# Patient Record
Sex: MALE | Race: WHITE | URBAN - METROPOLITAN AREA
[De-identification: names, ages, dates, MRNs, and addresses within clinical notes are randomized per-mention and may not be internally consistent; named-entity substitution may affect disease eponyms.]

---

## 2020-11-12 ENCOUNTER — OFFICE VISIT (OUTPATIENT)
Dept: URBAN - METROPOLITAN AREA CLINIC 71 | Facility: CLINIC | Age: 75
End: 2020-11-12
Payer: MEDICARE

## 2020-11-12 DIAGNOSIS — H52.4 PRESBYOPIA: ICD-10-CM

## 2020-11-12 DIAGNOSIS — H35.341 MACULAR CYST, HOLE, OR PSEUDOHOLE, RIGHT EYE: Primary | ICD-10-CM

## 2020-11-12 DIAGNOSIS — H35.371 PUCKERING OF MACULA, RIGHT EYE: ICD-10-CM

## 2020-11-12 PROCEDURE — 92014 COMPRE OPH EXAM EST PT 1/>: CPT | Performed by: OPHTHALMOLOGY

## 2020-11-12 PROCEDURE — 92134 CPTRZ OPH DX IMG PST SGM RTA: CPT | Performed by: OPHTHALMOLOGY

## 2020-11-12 PROCEDURE — 92133 CPTRZD OPH DX IMG PST SGM ON: CPT | Performed by: OPHTHALMOLOGY

## 2020-11-12 ASSESSMENT — VISUAL ACUITY
OD: 20/20
OS: 20/20

## 2020-11-12 ASSESSMENT — INTRAOCULAR PRESSURE
OD: 19
OS: 19

## 2020-11-12 NOTE — IMPRESSION/PLAN
Impression: Macular cyst, hole, or pseudohole, right eye: H35.341. OCT ordered. Plan: Previously diagnosed in 2017 and patient saw retinal specialist.
Floridalma Cordial to be stable and patient is correctable to 20/20. No treatment recommended at this time due to it not effecting vision. Patient to return annually for dilated exams with OCT testing.

## 2020-11-12 NOTE — IMPRESSION/PLAN
Impression: Puckering of macula, right eye: H35.371. Plan: No surgical treatment at this time. Monitor.

## 2020-11-12 NOTE — IMPRESSION/PLAN
Impression: Presbyopia: H52.4. Plan: New glasses rx given to patient today. Also gave patient a rx for shooting glasses.

## 2021-11-02 ENCOUNTER — OFFICE VISIT (OUTPATIENT)
Dept: URBAN - METROPOLITAN AREA CLINIC 71 | Facility: CLINIC | Age: 76
End: 2021-11-02
Payer: MEDICARE

## 2021-11-02 DIAGNOSIS — E11.3293 TYPE 2 DIAB WITH MILD NONP RTNOP WITHOUT MACULAR EDEMA, BI: ICD-10-CM

## 2021-11-02 DIAGNOSIS — H25.13 AGE-RELATED NUCLEAR CATARACT, BILATERAL: ICD-10-CM

## 2021-11-02 PROCEDURE — 92134 CPTRZ OPH DX IMG PST SGM RTA: CPT | Performed by: OPHTHALMOLOGY

## 2021-11-02 PROCEDURE — 99213 OFFICE O/P EST LOW 20 MIN: CPT | Performed by: OPHTHALMOLOGY

## 2021-11-02 ASSESSMENT — INTRAOCULAR PRESSURE
OD: 19
OS: 21

## 2021-11-02 ASSESSMENT — KERATOMETRY
OD: 43.00
OS: 43.00

## 2021-11-02 NOTE — IMPRESSION/PLAN
Impression: Puckering of macula, right eye: H35.371. Stable. Plan: Contact office if you experience a loss of vision or distortion.

## 2021-11-02 NOTE — IMPRESSION/PLAN
Impression: Macular cyst, hole, or pseudohole, right eye: H35.341. OCT ordered today and reviewed. Stable compared to previous scans. Plan: No treatment recommended at this time due to it not effecting vision. Call if any changes in vision occur. Will also refer patient back to a retinal specialist for evaluation due to the length of time since he has been seen. Patient to return annually for dilated exams with OCT testing.

## 2021-11-02 NOTE — IMPRESSION/PLAN
Impression: Age-related nuclear cataract, bilateral: H25.13. Slight progression. Patient is satisfied with vision at this time. Plan: No treatment currently recommended. The patient will monitor vision changes and contact us with any decrease in vision. When patient return for his annual dilated exam, we will do a cataract evaluation.

## 2021-11-02 NOTE — IMPRESSION/PLAN
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 Stable. Plan: No treatment necessary at this time. Patient was instructed to monitor vision for sudden changes and to call if visual changes noted. Discussed ocular and systemic benefits of blood sugar control.

## 2022-01-14 ENCOUNTER — OFFICE VISIT (OUTPATIENT)
Dept: URBAN - METROPOLITAN AREA CLINIC 70 | Facility: CLINIC | Age: 77
End: 2022-01-14
Payer: MEDICARE

## 2022-01-14 PROCEDURE — 99204 OFFICE O/P NEW MOD 45 MIN: CPT | Performed by: OPHTHALMOLOGY

## 2022-01-14 PROCEDURE — 92134 CPTRZ OPH DX IMG PST SGM RTA: CPT | Performed by: OPHTHALMOLOGY

## 2022-01-14 ASSESSMENT — INTRAOCULAR PRESSURE
OD: 24
OS: 25

## 2022-01-14 NOTE — IMPRESSION/PLAN
Impression: Diabetic Macular Edema, OU. Plan: No treatment necessary at this time. Patient was instructed to monitor vision for sudden changes and to call if visual changes noted. Discussed ocular and systemic benefits of blood sugar control. 

Schedule follow up with Dr. Adeline Madrid

## 2022-11-07 ENCOUNTER — OFFICE VISIT (OUTPATIENT)
Dept: URBAN - METROPOLITAN AREA CLINIC 71 | Facility: CLINIC | Age: 77
End: 2022-11-07
Payer: MEDICARE

## 2022-11-07 DIAGNOSIS — H35.371 PUCKERING OF MACULA, RIGHT EYE: ICD-10-CM

## 2022-11-07 DIAGNOSIS — E11.9 TYPE 2 DIABETES MELLITUS WITHOUT COMPLICATIONS: ICD-10-CM

## 2022-11-07 DIAGNOSIS — H35.341 MACULAR CYST, HOLE, OR PSEUDOHOLE, RIGHT EYE: ICD-10-CM

## 2022-11-07 DIAGNOSIS — H25.813 COMBINED FORMS OF AGE-RELATED CATARACT, BILATERAL: Primary | ICD-10-CM

## 2022-11-07 PROCEDURE — 92134 CPTRZ OPH DX IMG PST SGM RTA: CPT | Performed by: OPHTHALMOLOGY

## 2022-11-07 PROCEDURE — 99213 OFFICE O/P EST LOW 20 MIN: CPT | Performed by: OPHTHALMOLOGY

## 2022-11-07 ASSESSMENT — VISUAL ACUITY
OS: 20/30
OD: 20/30

## 2022-11-07 ASSESSMENT — KERATOMETRY
OD: 42.63
OS: 42.88

## 2022-11-07 ASSESSMENT — INTRAOCULAR PRESSURE
OD: 21
OS: 20

## 2022-11-07 NOTE — IMPRESSION/PLAN
Impression: Macular cyst, hole, or pseudohole, right eye: H35.341. OCT ordered today and reviewed. Remaining stable. No changes seen. Plan: No treatment recommended at this time due to it not effecting vision. Call if any changes in vision occur.

## 2022-11-07 NOTE — IMPRESSION/PLAN
Impression: Combined forms of age-related cataract, bilateral: H25.813. Slight progression. Discussed with patient. Plan: Patient would like to hold off on surgery at this time. Return in 1 year for a dilated exam with OCT testing.

## 2022-11-10 ENCOUNTER — OFFICE VISIT (OUTPATIENT)
Dept: URBAN - METROPOLITAN AREA CLINIC 71 | Facility: CLINIC | Age: 77
End: 2022-11-10

## 2022-11-10 DIAGNOSIS — H52.4 PRESBYOPIA: Primary | ICD-10-CM

## 2022-11-10 ASSESSMENT — VISUAL ACUITY
OS: 20/25
OD: 20/25

## 2023-11-08 ENCOUNTER — OFFICE VISIT (OUTPATIENT)
Dept: URBAN - METROPOLITAN AREA CLINIC 71 | Facility: CLINIC | Age: 78
End: 2023-11-08
Payer: MEDICARE

## 2023-11-08 DIAGNOSIS — H25.813 COMBINED FORMS OF AGE-RELATED CATARACT, BILATERAL: ICD-10-CM

## 2023-11-08 DIAGNOSIS — H35.371 PUCKERING OF MACULA, RIGHT EYE: ICD-10-CM

## 2023-11-08 DIAGNOSIS — E11.9 TYPE 2 DIABETES MELLITUS WITHOUT COMPLICATIONS: ICD-10-CM

## 2023-11-08 DIAGNOSIS — H35.341 MACULAR CYST, HOLE, OR PSEUDOHOLE, RIGHT EYE: Primary | ICD-10-CM

## 2023-11-08 PROCEDURE — 92134 CPTRZ OPH DX IMG PST SGM RTA: CPT | Performed by: OPHTHALMOLOGY

## 2023-11-08 PROCEDURE — 99214 OFFICE O/P EST MOD 30 MIN: CPT | Performed by: OPHTHALMOLOGY

## 2023-11-08 ASSESSMENT — INTRAOCULAR PRESSURE
OS: 20
OD: 21